# Patient Record
Sex: MALE | Race: WHITE | NOT HISPANIC OR LATINO | URBAN - METROPOLITAN AREA
[De-identification: names, ages, dates, MRNs, and addresses within clinical notes are randomized per-mention and may not be internally consistent; named-entity substitution may affect disease eponyms.]

---

## 2019-12-17 ENCOUNTER — OUTPATIENT (OUTPATIENT)
Dept: OUTPATIENT SERVICES | Facility: HOSPITAL | Age: 69
LOS: 1 days | Discharge: HOME | End: 2019-12-17
Payer: MEDICARE

## 2019-12-17 DIAGNOSIS — J18.9 PNEUMONIA, UNSPECIFIED ORGANISM: ICD-10-CM

## 2019-12-17 DIAGNOSIS — R05 COUGH: ICD-10-CM

## 2019-12-17 PROCEDURE — 71250 CT THORAX DX C-: CPT | Mod: 26

## 2020-02-18 ENCOUNTER — OUTPATIENT (OUTPATIENT)
Dept: OUTPATIENT SERVICES | Facility: HOSPITAL | Age: 70
LOS: 1 days | Discharge: HOME | End: 2020-02-18
Payer: MEDICARE

## 2020-02-18 DIAGNOSIS — R05 COUGH: ICD-10-CM

## 2020-02-18 PROCEDURE — 71250 CT THORAX DX C-: CPT | Mod: 26

## 2021-02-20 DIAGNOSIS — Z87.891 PERSONAL HISTORY OF NICOTINE DEPENDENCE: ICD-10-CM

## 2021-02-20 PROBLEM — Z00.00 ENCOUNTER FOR PREVENTIVE HEALTH EXAMINATION: Status: ACTIVE | Noted: 2021-02-20

## 2021-02-20 RX ORDER — DIAZEPAM 10 MG/1
10 TABLET ORAL
Refills: 0 | Status: ACTIVE | COMMUNITY

## 2021-02-20 RX ORDER — METFORMIN HYDROCHLORIDE 500 MG/1
500 TABLET, COATED ORAL
Refills: 0 | Status: ACTIVE | COMMUNITY

## 2021-02-20 RX ORDER — SIMVASTATIN 10 MG/1
10 TABLET, FILM COATED ORAL
Refills: 0 | Status: ACTIVE | COMMUNITY

## 2021-02-20 RX ORDER — ENALAPRIL MALEATE 20 MG/1
20 TABLET ORAL
Refills: 0 | Status: ACTIVE | COMMUNITY

## 2021-02-28 ENCOUNTER — LABORATORY RESULT (OUTPATIENT)
Age: 71
End: 2021-02-28

## 2021-02-28 ENCOUNTER — OUTPATIENT (OUTPATIENT)
Dept: OUTPATIENT SERVICES | Facility: HOSPITAL | Age: 71
LOS: 1 days | Discharge: HOME | End: 2021-02-28

## 2021-02-28 DIAGNOSIS — Z11.59 ENCOUNTER FOR SCREENING FOR OTHER VIRAL DISEASES: ICD-10-CM

## 2021-03-03 ENCOUNTER — APPOINTMENT (OUTPATIENT)
Dept: PULMONOLOGY | Facility: CLINIC | Age: 71
End: 2021-03-03
Payer: MEDICARE

## 2021-03-03 VITALS
OXYGEN SATURATION: 91 % | DIASTOLIC BLOOD PRESSURE: 88 MMHG | HEIGHT: 70 IN | BODY MASS INDEX: 26.2 KG/M2 | SYSTOLIC BLOOD PRESSURE: 144 MMHG | RESPIRATION RATE: 14 BRPM | WEIGHT: 183 LBS | TEMPERATURE: 97 F | HEART RATE: 96 BPM

## 2021-03-03 PROCEDURE — 99213 OFFICE O/P EST LOW 20 MIN: CPT | Mod: 25

## 2021-03-03 PROCEDURE — ZZZZZ: CPT

## 2021-03-03 PROCEDURE — 94010 BREATHING CAPACITY TEST: CPT

## 2021-03-03 PROCEDURE — 94727 GAS DIL/WSHOT DETER LNG VOL: CPT

## 2021-03-03 PROCEDURE — 94729 DIFFUSING CAPACITY: CPT

## 2021-03-03 NOTE — HISTORY OF PRESENT ILLNESS
[Dyspnea on Exertion] : no dyspnea on exertion [Dyspnea at Rest] : no dyspnea at rest [Dry Cough] : no dry cough [Productive Cough] : no productive cough [Wheezing] : no wheezing [Hemoptysis] : no hemoptysis [Follow-Up - Routine Clinic] : a routine clinic follow-up of [Excessive Daytime Sleepiness] : excessive daytime sleepiness [Snoring] : snoring [Sleepy When Sedentary] : sleepy when sedentary [Impaired Concentration] : impaired concentration [Currently Experiencing] : The patient is currently experiencing symptoms. [None] : No associated symptoms are reported [Good Sleep Hygiene] : good sleep hygiene Patient baseline mental status

## 2021-03-03 NOTE — PHYSICAL EXAM
[No Acute Distress] : no acute distress [Normal Oropharynx] : normal oropharynx [Normal Appearance] : normal appearance [No Neck Mass] : no neck mass [Normal Rate/Rhythm] : normal rate/rhythm [Normal S1, S2] : normal s1, s2 [No Murmurs] : no murmurs [No Resp Distress] : no resp distress [Clear to Auscultation Bilaterally] : clear to auscultation bilaterally [Rales] : rales [No Abnormalities] : no abnormalities [Benign] : benign [Normal Gait] : normal gait [No Clubbing] : no clubbing [No Cyanosis] : no cyanosis [No Edema] : no edema [FROM] : FROM [Normal Color/ Pigmentation] : normal color/ pigmentation [No Focal Deficits] : no focal deficits [Oriented x3] : oriented x3 [Normal Affect] : normal affect

## 2021-03-16 ENCOUNTER — RESULT REVIEW (OUTPATIENT)
Age: 71
End: 2021-03-16

## 2021-03-16 ENCOUNTER — OUTPATIENT (OUTPATIENT)
Dept: OUTPATIENT SERVICES | Facility: HOSPITAL | Age: 71
LOS: 1 days | Discharge: HOME | End: 2021-03-16
Payer: MEDICARE

## 2021-03-16 DIAGNOSIS — J84.112 IDIOPATHIC PULMONARY FIBROSIS: ICD-10-CM

## 2021-03-16 PROCEDURE — 71250 CT THORAX DX C-: CPT | Mod: 26

## 2021-09-15 ENCOUNTER — APPOINTMENT (OUTPATIENT)
Dept: PULMONOLOGY | Facility: CLINIC | Age: 71
End: 2021-09-15

## 2021-12-12 ENCOUNTER — LABORATORY RESULT (OUTPATIENT)
Age: 71
End: 2021-12-12

## 2021-12-15 ENCOUNTER — APPOINTMENT (OUTPATIENT)
Age: 71
End: 2021-12-15
Payer: MEDICARE

## 2021-12-15 VITALS
BODY MASS INDEX: 26.2 KG/M2 | HEIGHT: 70 IN | RESPIRATION RATE: 14 BRPM | SYSTOLIC BLOOD PRESSURE: 124 MMHG | WEIGHT: 183 LBS | OXYGEN SATURATION: 97 % | HEART RATE: 83 BPM | DIASTOLIC BLOOD PRESSURE: 80 MMHG

## 2021-12-15 PROCEDURE — 94010 BREATHING CAPACITY TEST: CPT

## 2021-12-15 PROCEDURE — 94727 GAS DIL/WSHOT DETER LNG VOL: CPT

## 2021-12-15 PROCEDURE — 99214 OFFICE O/P EST MOD 30 MIN: CPT | Mod: 25

## 2021-12-15 PROCEDURE — 94729 DIFFUSING CAPACITY: CPT

## 2021-12-15 NOTE — HISTORY OF PRESENT ILLNESS
[Dyspnea on Exertion] : no dyspnea on exertion [Dyspnea at Rest] : no dyspnea at rest [Dry Cough] : no dry cough [Productive Cough] : no productive cough [Wheezing] : no wheezing [Hemoptysis] : no hemoptysis [Follow-Up - Routine Clinic] : a routine clinic follow-up of [Excessive Daytime Sleepiness] : excessive daytime sleepiness [Snoring] : snoring [Sleepy When Sedentary] : sleepy when sedentary [Impaired Concentration] : impaired concentration [Currently Experiencing] : The patient is currently experiencing symptoms. [None] : No associated symptoms are reported [Good Sleep Hygiene] : good sleep hygiene

## 2022-01-05 ENCOUNTER — RESULT REVIEW (OUTPATIENT)
Age: 72
End: 2022-01-05

## 2022-01-05 ENCOUNTER — OUTPATIENT (OUTPATIENT)
Dept: OUTPATIENT SERVICES | Facility: HOSPITAL | Age: 72
LOS: 1 days | Discharge: HOME | End: 2022-01-05
Payer: MEDICARE

## 2022-01-05 DIAGNOSIS — R91.1 SOLITARY PULMONARY NODULE: ICD-10-CM

## 2022-01-05 PROCEDURE — 71250 CT THORAX DX C-: CPT | Mod: 26,MH

## 2022-03-16 ENCOUNTER — APPOINTMENT (OUTPATIENT)
Age: 72
End: 2022-03-16
Payer: MEDICARE

## 2022-03-16 VITALS
BODY MASS INDEX: 28.06 KG/M2 | HEIGHT: 70 IN | WEIGHT: 196 LBS | HEART RATE: 83 BPM | OXYGEN SATURATION: 96 % | SYSTOLIC BLOOD PRESSURE: 140 MMHG | RESPIRATION RATE: 12 BRPM | DIASTOLIC BLOOD PRESSURE: 80 MMHG

## 2022-03-16 PROCEDURE — 99214 OFFICE O/P EST MOD 30 MIN: CPT

## 2022-09-14 ENCOUNTER — APPOINTMENT (OUTPATIENT)
Age: 72
End: 2022-09-14

## 2022-09-14 VITALS
DIASTOLIC BLOOD PRESSURE: 70 MMHG | WEIGHT: 194 LBS | HEIGHT: 70 IN | HEART RATE: 88 BPM | SYSTOLIC BLOOD PRESSURE: 126 MMHG | BODY MASS INDEX: 27.77 KG/M2 | OXYGEN SATURATION: 96 % | RESPIRATION RATE: 14 BRPM

## 2022-09-14 PROCEDURE — 94727 GAS DIL/WSHOT DETER LNG VOL: CPT

## 2022-09-14 PROCEDURE — 99214 OFFICE O/P EST MOD 30 MIN: CPT | Mod: 25

## 2022-09-14 PROCEDURE — 94010 BREATHING CAPACITY TEST: CPT

## 2022-09-14 PROCEDURE — 94729 DIFFUSING CAPACITY: CPT

## 2022-09-14 RX ORDER — NINTEDANIB 150 MG/1
150 CAPSULE ORAL TWICE DAILY
Qty: 60 | Refills: 5 | Status: ACTIVE | COMMUNITY
Start: 2022-09-14 | End: 1900-01-01

## 2022-12-14 ENCOUNTER — APPOINTMENT (OUTPATIENT)
Age: 72
End: 2022-12-14

## 2022-12-14 VITALS
DIASTOLIC BLOOD PRESSURE: 70 MMHG | WEIGHT: 190 LBS | HEART RATE: 80 BPM | HEIGHT: 70 IN | OXYGEN SATURATION: 95 % | BODY MASS INDEX: 27.2 KG/M2 | SYSTOLIC BLOOD PRESSURE: 140 MMHG | RESPIRATION RATE: 12 BRPM

## 2022-12-14 PROCEDURE — 99214 OFFICE O/P EST MOD 30 MIN: CPT

## 2022-12-14 RX ORDER — PIRFENIDONE 267 MG/1
267 CAPSULE ORAL 3 TIMES DAILY
Qty: 270 | Refills: 3 | Status: ACTIVE | COMMUNITY
Start: 2022-12-14 | End: 1900-01-01

## 2022-12-14 NOTE — HISTORY OF PRESENT ILLNESS
[Follow-Up - Routine Clinic] : a routine clinic follow-up of [Excessive Daytime Sleepiness] : excessive daytime sleepiness [Snoring] : snoring [Sleepy When Sedentary] : sleepy when sedentary [Impaired Concentration] : impaired concentration [Currently Experiencing] : The patient is currently experiencing symptoms. [None] : No associated symptoms are reported [Good Sleep Hygiene] : good sleep hygiene [Dyspnea on Exertion] : no dyspnea on exertion [Dyspnea at Rest] : no dyspnea at rest [Dry Cough] : no dry cough [Productive Cough] : no productive cough [Wheezing] : no wheezing [Hemoptysis] : no hemoptysis

## 2022-12-21 ENCOUNTER — APPOINTMENT (OUTPATIENT)
Age: 72
End: 2022-12-21

## 2022-12-21 PROCEDURE — 94010 BREATHING CAPACITY TEST: CPT

## 2022-12-21 PROCEDURE — 94727 GAS DIL/WSHOT DETER LNG VOL: CPT

## 2022-12-21 PROCEDURE — 94729 DIFFUSING CAPACITY: CPT

## 2023-01-02 ENCOUNTER — OUTPATIENT (OUTPATIENT)
Dept: OUTPATIENT SERVICES | Facility: HOSPITAL | Age: 73
LOS: 1 days | Discharge: HOME | End: 2023-01-02
Payer: MEDICARE

## 2023-01-02 ENCOUNTER — RESULT REVIEW (OUTPATIENT)
Age: 73
End: 2023-01-02

## 2023-01-02 DIAGNOSIS — R91.1 SOLITARY PULMONARY NODULE: ICD-10-CM

## 2023-01-02 PROCEDURE — 71250 CT THORAX DX C-: CPT | Mod: 26,MH

## 2023-04-17 ENCOUNTER — APPOINTMENT (OUTPATIENT)
Dept: PULMONOLOGY | Facility: CLINIC | Age: 73
End: 2023-04-17
Payer: MEDICARE

## 2023-04-17 VITALS
BODY MASS INDEX: 27.2 KG/M2 | HEART RATE: 73 BPM | SYSTOLIC BLOOD PRESSURE: 120 MMHG | RESPIRATION RATE: 14 BRPM | HEIGHT: 70 IN | DIASTOLIC BLOOD PRESSURE: 80 MMHG | OXYGEN SATURATION: 96 % | WEIGHT: 190 LBS

## 2023-04-17 PROCEDURE — 99214 OFFICE O/P EST MOD 30 MIN: CPT | Mod: 25

## 2023-10-23 ENCOUNTER — APPOINTMENT (OUTPATIENT)
Dept: PULMONOLOGY | Facility: CLINIC | Age: 73
End: 2023-10-23
Payer: MEDICARE

## 2023-10-23 VITALS
SYSTOLIC BLOOD PRESSURE: 136 MMHG | RESPIRATION RATE: 14 BRPM | BODY MASS INDEX: 27.2 KG/M2 | HEIGHT: 70 IN | HEART RATE: 70 BPM | WEIGHT: 190 LBS | OXYGEN SATURATION: 96 % | DIASTOLIC BLOOD PRESSURE: 86 MMHG

## 2023-10-23 PROCEDURE — 94729 DIFFUSING CAPACITY: CPT

## 2023-10-23 PROCEDURE — 94010 BREATHING CAPACITY TEST: CPT

## 2023-10-23 PROCEDURE — 99213 OFFICE O/P EST LOW 20 MIN: CPT | Mod: 25

## 2024-01-08 ENCOUNTER — OUTPATIENT (OUTPATIENT)
Dept: OUTPATIENT SERVICES | Facility: HOSPITAL | Age: 74
LOS: 1 days | End: 2024-01-08
Payer: MEDICARE

## 2024-01-08 ENCOUNTER — RESULT REVIEW (OUTPATIENT)
Age: 74
End: 2024-01-08

## 2024-01-08 DIAGNOSIS — J84.9 INTERSTITIAL PULMONARY DISEASE, UNSPECIFIED: ICD-10-CM

## 2024-01-08 PROCEDURE — 71250 CT THORAX DX C-: CPT

## 2024-01-08 PROCEDURE — 71250 CT THORAX DX C-: CPT | Mod: 26,MH

## 2024-01-09 DIAGNOSIS — J84.9 INTERSTITIAL PULMONARY DISEASE, UNSPECIFIED: ICD-10-CM

## 2024-02-21 ENCOUNTER — APPOINTMENT (OUTPATIENT)
Dept: PULMONOLOGY | Facility: CLINIC | Age: 74
End: 2024-02-21
Payer: MEDICARE

## 2024-02-21 VITALS
HEIGHT: 70 IN | SYSTOLIC BLOOD PRESSURE: 170 MMHG | WEIGHT: 173 LBS | DIASTOLIC BLOOD PRESSURE: 90 MMHG | OXYGEN SATURATION: 95 % | RESPIRATION RATE: 14 BRPM | HEART RATE: 85 BPM | BODY MASS INDEX: 24.77 KG/M2

## 2024-02-21 PROCEDURE — 94727 GAS DIL/WSHOT DETER LNG VOL: CPT

## 2024-02-21 PROCEDURE — 99214 OFFICE O/P EST MOD 30 MIN: CPT | Mod: 25

## 2024-02-21 PROCEDURE — 94729 DIFFUSING CAPACITY: CPT

## 2024-02-21 PROCEDURE — 94010 BREATHING CAPACITY TEST: CPT

## 2024-02-21 NOTE — ASSESSMENT
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  PFT shows some declining function Was unable to get Ofev or Pirfenidone.   Possible OPAL not interested in testing at this point

## 2024-02-27 ENCOUNTER — APPOINTMENT (OUTPATIENT)
Dept: HEMATOLOGY ONCOLOGY | Facility: CLINIC | Age: 74
End: 2024-02-27
Payer: MEDICARE

## 2024-02-27 ENCOUNTER — OUTPATIENT (OUTPATIENT)
Dept: OUTPATIENT SERVICES | Facility: HOSPITAL | Age: 74
LOS: 1 days | End: 2024-02-27
Payer: MEDICARE

## 2024-02-27 VITALS
WEIGHT: 173 LBS | HEART RATE: 94 BPM | BODY MASS INDEX: 24.77 KG/M2 | HEIGHT: 70 IN | TEMPERATURE: 98.2 F | RESPIRATION RATE: 16 BRPM

## 2024-02-27 VITALS — SYSTOLIC BLOOD PRESSURE: 160 MMHG | DIASTOLIC BLOOD PRESSURE: 80 MMHG

## 2024-02-27 DIAGNOSIS — D68.2 HEREDITARY DEFICIENCY OF OTHER CLOTTING FACTORS: ICD-10-CM

## 2024-02-27 PROCEDURE — 81270 JAK2 GENE: CPT

## 2024-02-27 PROCEDURE — 99205 OFFICE O/P NEW HI 60 MIN: CPT

## 2024-02-27 PROCEDURE — 83550 IRON BINDING TEST: CPT

## 2024-02-27 PROCEDURE — G0452: CPT | Mod: 26

## 2024-02-27 PROCEDURE — 85027 COMPLETE CBC AUTOMATED: CPT

## 2024-02-27 PROCEDURE — 81206 BCR/ABL1 GENE MAJOR BP: CPT

## 2024-02-27 PROCEDURE — 81207 BCR/ABL1 GENE MINOR BP: CPT

## 2024-02-27 PROCEDURE — 80076 HEPATIC FUNCTION PANEL: CPT

## 2024-02-27 PROCEDURE — 80048 BASIC METABOLIC PNL TOTAL CA: CPT

## 2024-02-27 PROCEDURE — 83540 ASSAY OF IRON: CPT

## 2024-02-27 PROCEDURE — 82728 ASSAY OF FERRITIN: CPT

## 2024-02-27 NOTE — HISTORY OF PRESENT ILLNESS
[de-identified] : Mr. GREGOR BRAVO is a 73 year old male here today for evaluation and management of abnormal labwork (leukocytosis, thrombocytosis)    GREGOR is a 73 year old M with PMHx including ILD, IPF, OPAL, DM, CAD s/p stent placement in 2007, who presents to clinic to establish care.  Patient states that he had labwork performed with PCP in 11/2023 which showed elevated WBC and PLTs.  Patient was prescribed abx at the time since he was not feeling well at the time and was noted to have leukocytosis.  He still felt sick and was prescribed steroids in 01/2024.  He is presently feeling well with no new complaints.  Patient denies fevers, chills, recurrent infections, drenching night sweats, chest pain, dyspnea, unintentional weight loss or bleeding.  He notes that he purposefully lost weight over the last few months via diet modification.  Patient reports family history of malignancy or blood/clotting disorder.     RADIOLOGIC WORKUP CT Chest (1.8.2024) IMPRESSION:1. New patchy/nodular groundglass opacities, mucoid impaction, moderate air trapping, and likely reactive bihilar and mediastinal lymphadenopathy. Consider posttreatment 3 month follow-up CT chest.2. Mildly progressed UIP pattern ILD.     LAB WORKUP (2.23.2024) WBC 12.4, Hgb 14.8, , ANC 8804, ALC 2331, [place] 930, Eos 211, Baso 124 (12.29.2023 - [facility]) WBC 16.7, Hgb 13.7, Hct 40.5, , ANC 88781, ALC 2488, [place] 1188, Eos 284, Baso 134, Cr 1, eGFR 76, normal LFTs (11.23.2023 - [facility]) WBC 15, Hgb 14, Hct 41.1, , ANC 11.1, ALC 2.6, Mono 0.9, Eos 0.2, Baso 0.1 (6.28.2023) WBC 8.25  (6.6.2022) WBC 8.33, Hgb 14.7,   (1.31.2022) WBC 9.12, Hgb 14.3,    HCM  Colonoscopy overdue  Upper Endoscopy never done

## 2024-02-27 NOTE — REVIEW OF SYSTEMS
[Recent Change In Weight] : ~T recent weight change [Diarrhea: Grade 0] : Diarrhea: Grade 0 [Negative] : Heme/Lymph [FreeTextEntry2] : lost weight over last 4 months via diet modification

## 2024-02-27 NOTE — ASSESSMENT
[FreeTextEntry1] : # Leukocytosis, neutrophil-predominant, noted since 2023 - likely due to acute exacerbation of underlying pulmonary disease  - gradually improving as evidenced on subsequent CBCs - Labwork today: CBC, BMP, LFTs, iron studies, ferritin, JAK2 testing    # Thrombocytosis, intermittent since 2023 - resolved, possibly acute phase reactant   Encouraged to keep up to date with age appropriate screenings including but not limited to colonoscopy and upper endoscopy if needed.    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

## 2024-02-28 DIAGNOSIS — D68.2 HEREDITARY DEFICIENCY OF OTHER CLOTTING FACTORS: ICD-10-CM

## 2024-02-28 LAB
ALBUMIN SERPL ELPH-MCNC: 4.2 G/DL
ALP BLD-CCNC: 91 U/L
ALT SERPL-CCNC: 13 U/L
ANION GAP SERPL CALC-SCNC: 16 MMOL/L
AST SERPL-CCNC: 18 U/L
BASOPHILS # BLD AUTO: 0.13 K/UL
BASOPHILS NFR BLD AUTO: 0.9 %
BILIRUB DIRECT SERPL-MCNC: <0.2 MG/DL
BILIRUB INDIRECT SERPL-MCNC: >0.1 MG/DL
BILIRUB SERPL-MCNC: 0.3 MG/DL
BUN SERPL-MCNC: 19 MG/DL
CALCIUM SERPL-MCNC: 10 MG/DL
CHLORIDE SERPL-SCNC: 102 MMOL/L
CO2 SERPL-SCNC: 21 MMOL/L
CREAT SERPL-MCNC: 0.9 MG/DL
EGFR: 89 ML/MIN/1.73M2
EOSINOPHIL # BLD AUTO: 0.22 K/UL
EOSINOPHIL NFR BLD AUTO: 1.4 %
FERRITIN SERPL-MCNC: 209 NG/ML
GLUCOSE SERPL-MCNC: 128 MG/DL
HCT VFR BLD CALC: 44.4 %
HGB BLD-MCNC: 15 G/DL
IMM GRANULOCYTES NFR BLD AUTO: 0.3 %
IRON SATN MFR SERPL: 12 %
IRON SERPL-MCNC: 31 UG/DL
LYMPHOCYTES # BLD AUTO: 2.44 K/UL
LYMPHOCYTES NFR BLD AUTO: 16.1 %
MAN DIFF?: NORMAL
MCHC RBC-ENTMCNC: 29.9 PG
MCHC RBC-ENTMCNC: 33.8 G/DL
MCV RBC AUTO: 88.4 FL
MONOCYTES # BLD AUTO: 1.12 K/UL
MONOCYTES NFR BLD AUTO: 7.4 %
NEUTROPHILS # BLD AUTO: 11.25 K/UL
NEUTROPHILS NFR BLD AUTO: 73.9 %
PLATELET # BLD AUTO: 352 K/UL
PMV BLD AUTO: 0 /100 WBCS
POTASSIUM SERPL-SCNC: 4.5 MMOL/L
PROT SERPL-MCNC: 8.7 G/DL
RBC # BLD: 5.02 M/UL
RBC # FLD: 14.3 %
SODIUM SERPL-SCNC: 139 MMOL/L
TIBC SERPL-MCNC: 254 UG/DL
UIBC SERPL-MCNC: 223 UG/DL
WBC # FLD AUTO: 15.2 K/UL

## 2024-03-04 LAB — T(9;22)(ABL1,BCR)/CONTROL BLD/T: NORMAL

## 2024-03-05 LAB
JAK2 P.V617F BLD/T QL: NORMAL
REFLEX:: NORMAL

## 2024-03-18 ENCOUNTER — APPOINTMENT (OUTPATIENT)
Dept: HEMATOLOGY ONCOLOGY | Facility: CLINIC | Age: 74
End: 2024-03-18
Payer: MEDICARE

## 2024-03-18 ENCOUNTER — OUTPATIENT (OUTPATIENT)
Dept: OUTPATIENT SERVICES | Facility: HOSPITAL | Age: 74
LOS: 1 days | End: 2024-03-18
Payer: MEDICARE

## 2024-03-18 DIAGNOSIS — D75.839 THROMBOCYTOSIS, UNSPECIFIED: ICD-10-CM

## 2024-03-18 PROCEDURE — 99214 OFFICE O/P EST MOD 30 MIN: CPT

## 2024-03-18 NOTE — REVIEW OF SYSTEMS
[Recent Change In Weight] : ~T recent weight change [Diarrhea: Grade 0] : Diarrhea: Grade 0 [Negative] : Allergic/Immunologic [FreeTextEntry2] : lost weight over last 4 months via diet modification

## 2024-03-18 NOTE — HISTORY OF PRESENT ILLNESS
[de-identified] : Mr. GREGOR BRAVO is a 73 year old male here today for evaluation and management of abnormal labwork (leukocytosis, thrombocytosis)    GREGOR is a 73 year old M with PMHx including ILD, IPF, OPAL, DM, CAD s/p stent placement in 2007, who presents to clinic to establish care.  Patient states that he had labwork performed with PCP in 11/2023 which showed elevated WBC and PLTs.  Patient was prescribed abx at the time since he was not feeling well at the time and was noted to have leukocytosis.  He still felt sick and was prescribed steroids in 01/2024.  He is presently feeling well with no new complaints.  Patient denies fevers, chills, recurrent infections, drenching night sweats, chest pain, dyspnea, unintentional weight loss or bleeding.  He notes that he purposefully lost weight over the last few months via diet modification.  Patient reports family history of malignancy or blood/clotting disorder.     RADIOLOGIC WORKUP CT Chest (1.8.2024) IMPRESSION:1. New patchy/nodular groundglass opacities, mucoid impaction, moderate air trapping, and likely reactive bihilar and mediastinal lymphadenopathy. Consider posttreatment 3 month follow-up CT chest.2. Mildly progressed UIP pattern ILD.     LAB WORKUP (2.23.2024) WBC 12.4, Hgb 14.8, , ANC 8804, ALC 2331, [place] 930, Eos 211, Baso 124 (12.29.2023 - [facility]) WBC 16.7, Hgb 13.7, Hct 40.5, , ANC 24651, ALC 2488, Mono 1188, Eos 284, Baso 134, Cr 1, eGFR 76, normal LFTs (11.23.2023 - [facility]) WBC 15, Hgb 14, Hct 41.1, , ANC 11.1, ALC 2.6, Mono 0.9, Eos 0.2, Baso 0.1 (6.28.2023) WBC 8.25  (6.6.2022) WBC 8.33, Hgb 14.7,   (1.31.2022) WBC 9.12, Hgb 14.3,    HCM  Colonoscopy overdue  Upper Endoscopy never done  [de-identified] : 3/18/24 Patient is here for a follow-up visit for abnormal labwork (leukocytosis, thrombocytosis).  He is feeling well with no new complaints.  Reviewed most recent CBC, which is stable.  Patient denies fever, chills, nausea, vomiting, dyspnea or bleeding.  Reviewed initial workup which was essentially unrevealing: BCR-ABL not detected and Negative for JAK2 V617F mutation.

## 2024-03-18 NOTE — ASSESSMENT
[FreeTextEntry1] : # Leukocytosis, neutrophil-predominant, noted since 2023 - likely due to acute exacerbation of underlying pulmonary disease  - gradually improving as evidenced on subsequent CBCs - Labwork today: CBC, BMP, LFTs, iron studies, ferritin, JAK2 testing    # Thrombocytosis, intermittent since 2023 - resolved, possibly acute phase reactant   Encouraged to keep up to date with age appropriate screenings including but not limited to colonoscopy and upper endoscopy if needed.    RTC in 3 weeks to discuss findings   74 yo man with pulmonary fibroisis is evalauted for mild leukocytosis, noted in 11-12/2023 at the time of exacerbation; has been treated by pulmonary team and PMD; over the past month, his symptoms have improved; i suspect that mild leukocytosis is due to acute exacerbation of pulmonary fibroiss/ inflammation; doubt this is related due to chronic or acute bone marrow disorder; will repeat labs including BCRABL is not detected, which makes leukocytosis is more reactive than due to bone marrow dx;  iron deficiency; repeat labs in 2=3 mos

## 2024-03-19 DIAGNOSIS — D75.839 THROMBOCYTOSIS, UNSPECIFIED: ICD-10-CM

## 2024-05-08 ENCOUNTER — APPOINTMENT (OUTPATIENT)
Dept: PULMONOLOGY | Facility: CLINIC | Age: 74
End: 2024-05-08
Payer: MEDICARE

## 2024-05-08 VITALS
SYSTOLIC BLOOD PRESSURE: 112 MMHG | HEIGHT: 70 IN | RESPIRATION RATE: 14 BRPM | HEART RATE: 92 BPM | DIASTOLIC BLOOD PRESSURE: 80 MMHG | OXYGEN SATURATION: 95 % | WEIGHT: 174 LBS | BODY MASS INDEX: 24.91 KG/M2

## 2024-05-08 DIAGNOSIS — J84.9 INTERSTITIAL PULMONARY DISEASE, UNSPECIFIED: ICD-10-CM

## 2024-05-08 DIAGNOSIS — J84.112 IDIOPATHIC PULMONARY FIBROSIS: ICD-10-CM

## 2024-05-08 DIAGNOSIS — R91.8 OTHER NONSPECIFIC ABNORMAL FINDING OF LUNG FIELD: ICD-10-CM

## 2024-05-08 DIAGNOSIS — G47.33 OBSTRUCTIVE SLEEP APNEA (ADULT) (PEDIATRIC): ICD-10-CM

## 2024-05-08 PROCEDURE — 99214 OFFICE O/P EST MOD 30 MIN: CPT

## 2024-05-08 NOTE — ASSESSMENT
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  PFT shows some declining function Was unable to get Ofev or Pirfenidone.   Now on Ofev  Possible OPAL not interested in testing at this point

## 2024-06-03 RX ORDER — NINTEDANIB 150 MG/1
150 CAPSULE ORAL
Qty: 180 | Refills: 3 | Status: ACTIVE | COMMUNITY
Start: 2024-02-21 | End: 1900-01-01

## 2024-06-19 ENCOUNTER — LABORATORY RESULT (OUTPATIENT)
Age: 74
End: 2024-06-19

## 2024-06-19 ENCOUNTER — OUTPATIENT (OUTPATIENT)
Dept: OUTPATIENT SERVICES | Facility: HOSPITAL | Age: 74
LOS: 1 days | End: 2024-06-19
Payer: MEDICARE

## 2024-06-19 ENCOUNTER — APPOINTMENT (OUTPATIENT)
Age: 74
End: 2024-06-19
Payer: MEDICARE

## 2024-06-19 VITALS
DIASTOLIC BLOOD PRESSURE: 86 MMHG | BODY MASS INDEX: 26 KG/M2 | TEMPERATURE: 97.7 F | WEIGHT: 175.56 LBS | HEIGHT: 69 IN | SYSTOLIC BLOOD PRESSURE: 178 MMHG | HEART RATE: 78 BPM | OXYGEN SATURATION: 96 %

## 2024-06-19 DIAGNOSIS — D72.829 ELEVATED WHITE BLOOD CELL COUNT, UNSPECIFIED: ICD-10-CM

## 2024-06-19 DIAGNOSIS — D75.839 THROMBOCYTOSIS, UNSPECIFIED: ICD-10-CM

## 2024-06-19 LAB
ALBUMIN SERPL ELPH-MCNC: 4.6 G/DL
ALP BLD-CCNC: 64 U/L
ALT SERPL-CCNC: 15 U/L
ANION GAP SERPL CALC-SCNC: 12 MMOL/L
AST SERPL-CCNC: 19 U/L
BILIRUB DIRECT SERPL-MCNC: <0.2 MG/DL
BILIRUB INDIRECT SERPL-MCNC: >0.2 MG/DL
BILIRUB SERPL-MCNC: 0.4 MG/DL
BUN SERPL-MCNC: 21 MG/DL
CALCIUM SERPL-MCNC: 9.9 MG/DL
CHLORIDE SERPL-SCNC: 103 MMOL/L
CO2 SERPL-SCNC: 26 MMOL/L
CREAT SERPL-MCNC: 0.9 MG/DL
EGFR: 89 ML/MIN/1.73M2
GLUCOSE SERPL-MCNC: 102 MG/DL
HCT VFR BLD CALC: 43.5 %
HGB BLD-MCNC: 14.6 G/DL
IRON SATN MFR SERPL: 24 %
IRON SERPL-MCNC: 65 UG/DL
MCHC RBC-ENTMCNC: 30.5 PG
MCHC RBC-ENTMCNC: 33.6 G/DL
MCV RBC AUTO: 91 FL
PLATELET # BLD AUTO: 233 K/UL
PMV BLD: 10.8 FL
POTASSIUM SERPL-SCNC: 4.6 MMOL/L
PROT SERPL-MCNC: 7.1 G/DL
RBC # BLD: 4.78 M/UL
RBC # FLD: 15.5 %
SODIUM SERPL-SCNC: 141 MMOL/L
TIBC SERPL-MCNC: 266 UG/DL
UIBC SERPL-MCNC: 201 UG/DL
WBC # FLD AUTO: 10.18 K/UL

## 2024-06-19 PROCEDURE — 80076 HEPATIC FUNCTION PANEL: CPT

## 2024-06-19 PROCEDURE — 83540 ASSAY OF IRON: CPT

## 2024-06-19 PROCEDURE — 87340 HEPATITIS B SURFACE AG IA: CPT

## 2024-06-19 PROCEDURE — 80048 BASIC METABOLIC PNL TOTAL CA: CPT

## 2024-06-19 PROCEDURE — 82728 ASSAY OF FERRITIN: CPT

## 2024-06-19 PROCEDURE — 99214 OFFICE O/P EST MOD 30 MIN: CPT

## 2024-06-19 PROCEDURE — 85027 COMPLETE CBC AUTOMATED: CPT

## 2024-06-19 PROCEDURE — 83550 IRON BINDING TEST: CPT

## 2024-06-19 PROCEDURE — 83036 HEMOGLOBIN GLYCOSYLATED A1C: CPT

## 2024-06-19 RX ORDER — METFORMIN HYDROCHLORIDE 500 MG/1
500 TABLET, COATED ORAL
Refills: 0 | Status: ACTIVE | COMMUNITY

## 2024-06-19 RX ORDER — EMPAGLIFLOZIN 25 MG/1
25 TABLET, FILM COATED ORAL
Refills: 0 | Status: ACTIVE | COMMUNITY

## 2024-06-19 RX ORDER — VALSARTAN 320 MG/1
320 TABLET, COATED ORAL
Refills: 0 | Status: ACTIVE | COMMUNITY

## 2024-06-19 RX ORDER — AMLODIPINE BESYLATE 5 MG/1
5 TABLET ORAL
Refills: 0 | Status: ACTIVE | COMMUNITY

## 2024-06-19 NOTE — HISTORY OF PRESENT ILLNESS
[de-identified] : Mr. GREGOR BRAVO is a 73 year old male here today for evaluation and management of abnormal labwork (leukocytosis, thrombocytosis)    GREGOR is a 73 year old M with PMHx including ILD, IPF, OPAL, DM, CAD s/p stent placement in 2007, who presents to clinic to establish care.  Patient states that he had labwork performed with PCP in 11/2023 which showed elevated WBC and PLTs.  Patient was prescribed abx at the time since he was not feeling well at the time and was noted to have leukocytosis.  He still felt sick and was prescribed steroids in 01/2024.  He is presently feeling well with no new complaints.  Patient denies fevers, chills, recurrent infections, drenching night sweats, chest pain, dyspnea, unintentional weight loss or bleeding.  He notes that he purposefully lost weight over the last few months via diet modification.  Patient reports family history of malignancy or blood/clotting disorder.     RADIOLOGIC WORKUP CT Chest (1.8.2024) IMPRESSION:1. New patchy/nodular groundglass opacities, mucoid impaction, moderate air trapping, and likely reactive bihilar and mediastinal lymphadenopathy. Consider posttreatment 3 month follow-up CT chest.2. Mildly progressed UIP pattern ILD.     LAB WORKUP (2.23.2024) WBC 12.4, Hgb 14.8, , ANC 8804, ALC 2331, [place] 930, Eos 211, Baso 124 (12.29.2023 - [facility]) WBC 16.7, Hgb 13.7, Hct 40.5, , ANC 78411, ALC 2488, Mono 1188, Eos 284, Baso 134, Cr 1, eGFR 76, normal LFTs (11.23.2023 - [facility]) WBC 15, Hgb 14, Hct 41.1, , ANC 11.1, ALC 2.6, Mono 0.9, Eos 0.2, Baso 0.1 (6.28.2023) WBC 8.25  (6.6.2022) WBC 8.33, Hgb 14.7,   (1.31.2022) WBC 9.12, Hgb 14.3,    HCM  Colonoscopy overdue  Upper Endoscopy never done  [de-identified] : 3/18/24 Patient is here for a follow-up visit for abnormal labwork (leukocytosis, thrombocytosis).  He is feeling well with no new complaints.  Reviewed most recent CBC, which is stable.  Patient denies fever, chills, nausea, vomiting, dyspnea or bleeding.  Reviewed initial workup which was essentially unrevealing: BCR-ABL not detected and Negative for JAK2 V617F mutation.    6/19/24 Patient is here for a follow-up visit for abnormal labwork (leukocytosis, thrombocytosis).  He is feeling well with no new complaints.  Reviewed most recent CBC, which is normal.  Patient denies fever, chills, nausea, vomiting, dyspnea or bleeding.  He had lapse in Ofev treatment from [facility] due to recent loss of his spouse and insurance troubles.

## 2024-06-19 NOTE — ASSESSMENT
[FreeTextEntry1] : # Leukocytosis, neutrophil-predominant, noted since 2023 - likely due to acute exacerbation of underlying pulmonary disease  - gradually improving as evidenced on subsequent CBCs - JAK2 (-) , BCRABL normal from 02/2024  - resolved   # Thrombocytosis, intermittent since 2023 - resolved, possibly acute phase reactant   Encouraged to keep up to date with age appropriate screenings including but not limited to colonoscopy and upper endoscopy if needed.  Advised to discuss with PCP regarding more strict control of BP.    Explained that he can likely follow with PCP for close monitoring, as long as they are comfortable, and return to clinic as needed for any hematologic/oncologic concern.  Otherwise, may return in 6 months with CBC.     72 yo man with pulmonary fibroisis is evalauted for mild leukocytosis, noted in 11-12/2023 at the time of exacerbation; has been treated by pulmonary team and PMD; over the past month, his symptoms have improved; i suspect that mild leukocytosis is due to acute exacerbation of pulmonary fibroiss/ inflammation; doubt this is related due to chronic or acute bone marrow disorder; will repeat labs including BCRABL is not detected, which makes leukocytosis is more reactive than due to bone marrow dx;  iron deficiency; repeat labs in 2=3 mos seen/examined w/ NP Yvette, note reviewed, case discussed; agree w/ plan

## 2024-06-20 DIAGNOSIS — D75.839 THROMBOCYTOSIS, UNSPECIFIED: ICD-10-CM

## 2024-06-20 LAB — FERRITIN SERPL-MCNC: 113 NG/ML

## 2024-07-16 ENCOUNTER — APPOINTMENT (OUTPATIENT)
Dept: PULMONOLOGY | Facility: HOSPITAL | Age: 74
End: 2024-07-16

## 2024-07-16 ENCOUNTER — OUTPATIENT (OUTPATIENT)
Dept: OUTPATIENT SERVICES | Facility: HOSPITAL | Age: 74
LOS: 1 days | End: 2024-07-16

## 2024-07-16 DIAGNOSIS — R06.02 SHORTNESS OF BREATH: ICD-10-CM

## 2024-07-16 PROCEDURE — 94664 DEMO&/EVAL PT USE INHALER: CPT

## 2024-07-16 PROCEDURE — 94729 DIFFUSING CAPACITY: CPT

## 2024-07-16 PROCEDURE — 94060 EVALUATION OF WHEEZING: CPT | Mod: 26

## 2024-07-16 PROCEDURE — 94070 EVALUATION OF WHEEZING: CPT

## 2024-07-16 PROCEDURE — 94729 DIFFUSING CAPACITY: CPT | Mod: 26

## 2024-07-16 PROCEDURE — 94727 GAS DIL/WSHOT DETER LNG VOL: CPT | Mod: 26

## 2024-07-16 PROCEDURE — 94727 GAS DIL/WSHOT DETER LNG VOL: CPT

## 2024-07-17 DIAGNOSIS — R06.02 SHORTNESS OF BREATH: ICD-10-CM

## 2024-09-09 ENCOUNTER — APPOINTMENT (OUTPATIENT)
Dept: PULMONOLOGY | Facility: CLINIC | Age: 74
End: 2024-09-09
Payer: MEDICARE

## 2024-09-09 DIAGNOSIS — J84.112 IDIOPATHIC PULMONARY FIBROSIS: ICD-10-CM

## 2024-09-09 DIAGNOSIS — R91.8 OTHER NONSPECIFIC ABNORMAL FINDING OF LUNG FIELD: ICD-10-CM

## 2024-09-09 DIAGNOSIS — G47.33 OBSTRUCTIVE SLEEP APNEA (ADULT) (PEDIATRIC): ICD-10-CM

## 2024-09-09 DIAGNOSIS — J84.9 INTERSTITIAL PULMONARY DISEASE, UNSPECIFIED: ICD-10-CM

## 2024-09-09 PROCEDURE — 99214 OFFICE O/P EST MOD 30 MIN: CPT

## 2024-09-09 PROCEDURE — G2211 COMPLEX E/M VISIT ADD ON: CPT

## 2024-09-09 NOTE — ASSESSMENT
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  PFT Stable to improving  Was unable to get Ofev or Pirfenidone.   Now on Ofev 150 BID  Possible OPAL not interested in testing at this point

## 2024-09-16 ENCOUNTER — APPOINTMENT (OUTPATIENT)
Dept: PULMONOLOGY | Facility: HOSPITAL | Age: 74
End: 2024-09-16

## 2024-09-24 ENCOUNTER — APPOINTMENT (OUTPATIENT)
Dept: ORTHOPEDIC SURGERY | Facility: CLINIC | Age: 74
End: 2024-09-24

## 2024-09-24 DIAGNOSIS — S52.124A NONDISPLACED FRACTURE OF HEAD OF RIGHT RADIUS, INITIAL ENCOUNTER FOR CLOSED FRACTURE: ICD-10-CM

## 2024-09-24 PROCEDURE — 99203 OFFICE O/P NEW LOW 30 MIN: CPT

## 2024-09-24 PROCEDURE — 24650 CLTX RDL HEAD/NCK FX WO MNPJ: CPT | Mod: RT

## 2024-09-24 PROCEDURE — 73080 X-RAY EXAM OF ELBOW: CPT | Mod: RT

## 2024-09-24 NOTE — HISTORY OF PRESENT ILLNESS
[de-identified] : 74-year-old gentleman presents to my office on referral for management of right hand dominant radial head fracture sustained as result of a ground-level fall on August 9, 2024.  Initially attributed his pain to a contusion or sprain he was seen by his primary medical doctor.  Because of persistent pain radiographs obtained which demonstrated above-noted fracture.  Patient referred to my office for management.  The patient notes that this pain has been steadily improving.  His function has been improving.  He has avoided any lifting with his arm which exacerbates the pain.  Certain rotational motions also elicit discomfort.  He has no sensory complaints.  He is right-hand dominant.  No history of prior elbow problems or problems with his right upper extremity in general.  Past medical history: Non-insulin-dependent diabetes Hypertension Hyperlipidemia Interstitial lung disease  Medications: Valsartan Amlodipine Metformin Jardiance Stan? Simvastatin Valium as needed  Social: Non-smoker, noted drinking, recent , retired

## 2024-09-24 NOTE — ASSESSMENT
[FreeTextEntry1] : 74-year-old gentleman nondisplaced right radial head fracture amenable to continue nonoperative management.  He is a 5 pound lifting restriction at this point time which can be advanced to 10 pounds in 3 weeks.  He can utilize his brace as needed.  Will have him follow-up in 3 months for interval check.  At 12 weeks from injury can eliminate his restrictions.  If he is doing particular well he can cancel follow-up visit.  If he wants we can obtain repeat radiographs on follow-up.  All questions were answered to his satisfaction.  Copy note should be CCed to his primary medical doctor.  Patient agrees with the plan.

## 2024-09-24 NOTE — IMAGING
[de-identified] : Pleasant older gentleman sits comfortably my office no distress.  Physical examination: Left elbow: Some tenderness palpation over the lateral aspects of his elbow over the radiocapitellar joint.  Mild crepitus.  This tenderness is minimal.  Range of motion of his elbow is full 0-145 degrees.  He has essentially full supination pronation arc.  No epitrochlear lymph nodes.  No undue swelling.  Radiographs: Right elbow (AP, internal rotation AP, lateral): Nondisplaced right radial head fracture.

## 2024-10-28 ENCOUNTER — OUTPATIENT (OUTPATIENT)
Dept: OUTPATIENT SERVICES | Facility: HOSPITAL | Age: 74
LOS: 1 days | End: 2024-10-28
Payer: MEDICARE

## 2024-10-28 ENCOUNTER — APPOINTMENT (OUTPATIENT)
Dept: PULMONOLOGY | Facility: HOSPITAL | Age: 74
End: 2024-10-28

## 2024-10-28 DIAGNOSIS — R06.02 SHORTNESS OF BREATH: ICD-10-CM

## 2024-10-28 PROCEDURE — 94070 EVALUATION OF WHEEZING: CPT

## 2024-10-28 PROCEDURE — 94727 GAS DIL/WSHOT DETER LNG VOL: CPT

## 2024-10-28 PROCEDURE — 94729 DIFFUSING CAPACITY: CPT

## 2024-10-28 PROCEDURE — 94729 DIFFUSING CAPACITY: CPT | Mod: 26

## 2024-10-28 PROCEDURE — 94060 EVALUATION OF WHEEZING: CPT | Mod: 26

## 2024-10-28 PROCEDURE — 94727 GAS DIL/WSHOT DETER LNG VOL: CPT | Mod: 26

## 2024-10-28 PROCEDURE — 94664 DEMO&/EVAL PT USE INHALER: CPT

## 2024-10-29 DIAGNOSIS — R06.02 SHORTNESS OF BREATH: ICD-10-CM

## 2024-11-04 ENCOUNTER — APPOINTMENT (OUTPATIENT)
Dept: PULMONOLOGY | Facility: CLINIC | Age: 74
End: 2024-11-04
Payer: MEDICARE

## 2024-11-04 VITALS
HEIGHT: 69 IN | DIASTOLIC BLOOD PRESSURE: 96 MMHG | HEART RATE: 85 BPM | SYSTOLIC BLOOD PRESSURE: 156 MMHG | WEIGHT: 185 LBS | BODY MASS INDEX: 27.4 KG/M2 | RESPIRATION RATE: 14 BRPM | OXYGEN SATURATION: 95 %

## 2024-11-04 DIAGNOSIS — G47.33 OBSTRUCTIVE SLEEP APNEA (ADULT) (PEDIATRIC): ICD-10-CM

## 2024-11-04 DIAGNOSIS — J84.112 IDIOPATHIC PULMONARY FIBROSIS: ICD-10-CM

## 2024-11-04 DIAGNOSIS — J84.9 INTERSTITIAL PULMONARY DISEASE, UNSPECIFIED: ICD-10-CM

## 2024-11-04 PROCEDURE — G2211 COMPLEX E/M VISIT ADD ON: CPT

## 2024-11-04 PROCEDURE — 99214 OFFICE O/P EST MOD 30 MIN: CPT

## 2024-12-16 ENCOUNTER — OUTPATIENT (OUTPATIENT)
Dept: OUTPATIENT SERVICES | Facility: HOSPITAL | Age: 74
LOS: 1 days | End: 2024-12-16
Payer: MEDICARE

## 2024-12-16 ENCOUNTER — LABORATORY RESULT (OUTPATIENT)
Age: 74
End: 2024-12-16

## 2024-12-16 ENCOUNTER — APPOINTMENT (OUTPATIENT)
Age: 74
End: 2024-12-16
Payer: MEDICARE

## 2024-12-16 VITALS
HEIGHT: 69 IN | WEIGHT: 190 LBS | BODY MASS INDEX: 28.14 KG/M2 | SYSTOLIC BLOOD PRESSURE: 179 MMHG | OXYGEN SATURATION: 95 % | RESPIRATION RATE: 16 BRPM | HEART RATE: 84 BPM | TEMPERATURE: 97.5 F | DIASTOLIC BLOOD PRESSURE: 84 MMHG

## 2024-12-16 DIAGNOSIS — D72.829 ELEVATED WHITE BLOOD CELL COUNT, UNSPECIFIED: ICD-10-CM

## 2024-12-16 DIAGNOSIS — D75.839 THROMBOCYTOSIS, UNSPECIFIED: ICD-10-CM

## 2024-12-16 LAB
HCT VFR BLD CALC: 45.1 %
HGB BLD-MCNC: 15.8 G/DL
MCHC RBC-ENTMCNC: 31.1 PG
MCHC RBC-ENTMCNC: 35 G/DL
MCV RBC AUTO: 88.8 FL
PLATELET # BLD AUTO: 237 K/UL
PMV BLD: 10.8 FL
RBC # BLD: 5.08 M/UL
RBC # FLD: 13.6 %
WBC # FLD AUTO: 13.17 K/UL

## 2024-12-16 PROCEDURE — G2211 COMPLEX E/M VISIT ADD ON: CPT

## 2024-12-16 PROCEDURE — 99214 OFFICE O/P EST MOD 30 MIN: CPT

## 2024-12-16 PROCEDURE — 85027 COMPLETE CBC AUTOMATED: CPT

## 2024-12-17 ENCOUNTER — APPOINTMENT (OUTPATIENT)
Dept: ORTHOPEDIC SURGERY | Facility: CLINIC | Age: 74
End: 2024-12-17
Payer: MEDICARE

## 2024-12-17 ENCOUNTER — NON-APPOINTMENT (OUTPATIENT)
Age: 74
End: 2024-12-17

## 2024-12-17 DIAGNOSIS — S52.124A NONDISPLACED FRACTURE OF HEAD OF RIGHT RADIUS, INITIAL ENCOUNTER FOR CLOSED FRACTURE: ICD-10-CM

## 2024-12-17 DIAGNOSIS — D75.839 THROMBOCYTOSIS, UNSPECIFIED: ICD-10-CM

## 2024-12-17 DIAGNOSIS — M77.8 OTHER ENTHESOPATHIES, NOT ELSEWHERE CLASSIFIED: ICD-10-CM

## 2024-12-17 PROCEDURE — 73110 X-RAY EXAM OF WRIST: CPT | Mod: RT

## 2024-12-17 PROCEDURE — 73080 X-RAY EXAM OF ELBOW: CPT | Mod: RT

## 2025-03-18 ENCOUNTER — OUTPATIENT (OUTPATIENT)
Dept: OUTPATIENT SERVICES | Facility: HOSPITAL | Age: 75
LOS: 1 days | End: 2025-03-18
Payer: MEDICARE

## 2025-03-18 ENCOUNTER — APPOINTMENT (OUTPATIENT)
Age: 75
End: 2025-03-18
Payer: MEDICARE

## 2025-03-18 VITALS
WEIGHT: 191 LBS | OXYGEN SATURATION: 98 % | HEART RATE: 84 BPM | RESPIRATION RATE: 16 BRPM | TEMPERATURE: 97.4 F | DIASTOLIC BLOOD PRESSURE: 82 MMHG | SYSTOLIC BLOOD PRESSURE: 179 MMHG | HEIGHT: 69 IN | BODY MASS INDEX: 28.29 KG/M2

## 2025-03-18 DIAGNOSIS — D75.839 THROMBOCYTOSIS, UNSPECIFIED: ICD-10-CM

## 2025-03-18 DIAGNOSIS — D72.829 ELEVATED WHITE BLOOD CELL COUNT, UNSPECIFIED: ICD-10-CM

## 2025-03-18 LAB
AUTO BASOPHILS #: 0.11 K/UL
AUTO BASOPHILS %: 1 %
AUTO EOSINOPHILS #: 0.22 K/UL
AUTO EOSINOPHILS %: 2 %
AUTO IMMATURE GRANULOCYTES #: 0.03 K/UL
AUTO LYMPHOCYTES #: 2.37 K/UL
AUTO LYMPHOCYTES %: 21.8 %
AUTO MONOCYTES #: 0.95 K/UL
AUTO MONOCYTES %: 8.7 %
AUTO NEUTROPHILS #: 7.21 K/UL
AUTO NEUTROPHILS %: 66.2 %
AUTO NRBC #: 0 K/UL
HCT VFR BLD CALC: 46.4 %
HGB BLD-MCNC: 15.9 G/DL
IMM GRANULOCYTES NFR BLD AUTO: 0.3 %
MAN DIFF?: NORMAL
MCHC RBC-ENTMCNC: 31.2 PG
MCHC RBC-ENTMCNC: 34.3 G/DL
MCV RBC AUTO: 91.2 FL
PLATELET # BLD AUTO: 270 K/UL
PMV BLD AUTO: 0 /100 WBCS
PMV BLD: 10.1 FL
RBC # BLD: 5.09 M/UL
RBC # FLD: 13.5 %
WBC # FLD AUTO: 10.89 K/UL

## 2025-03-18 PROCEDURE — 99214 OFFICE O/P EST MOD 30 MIN: CPT

## 2025-03-18 PROCEDURE — 85025 COMPLETE CBC W/AUTO DIFF WBC: CPT

## 2025-03-18 PROCEDURE — G2211 COMPLEX E/M VISIT ADD ON: CPT

## 2025-03-19 DIAGNOSIS — D75.839 THROMBOCYTOSIS, UNSPECIFIED: ICD-10-CM

## 2025-03-31 ENCOUNTER — APPOINTMENT (OUTPATIENT)
Dept: PULMONOLOGY | Facility: CLINIC | Age: 75
End: 2025-03-31
Payer: MEDICARE

## 2025-03-31 VITALS
DIASTOLIC BLOOD PRESSURE: 80 MMHG | OXYGEN SATURATION: 93 % | HEART RATE: 95 BPM | HEIGHT: 69 IN | RESPIRATION RATE: 12 BRPM | WEIGHT: 190 LBS | SYSTOLIC BLOOD PRESSURE: 180 MMHG | BODY MASS INDEX: 28.14 KG/M2

## 2025-03-31 DIAGNOSIS — J84.112 IDIOPATHIC PULMONARY FIBROSIS: ICD-10-CM

## 2025-03-31 DIAGNOSIS — G47.33 OBSTRUCTIVE SLEEP APNEA (ADULT) (PEDIATRIC): ICD-10-CM

## 2025-03-31 DIAGNOSIS — R91.8 OTHER NONSPECIFIC ABNORMAL FINDING OF LUNG FIELD: ICD-10-CM

## 2025-03-31 DIAGNOSIS — J84.9 INTERSTITIAL PULMONARY DISEASE, UNSPECIFIED: ICD-10-CM

## 2025-03-31 PROCEDURE — 99214 OFFICE O/P EST MOD 30 MIN: CPT

## 2025-04-03 ENCOUNTER — OUTPATIENT (OUTPATIENT)
Dept: OUTPATIENT SERVICES | Facility: HOSPITAL | Age: 75
LOS: 1 days | End: 2025-04-03
Payer: MEDICARE

## 2025-04-03 ENCOUNTER — APPOINTMENT (OUTPATIENT)
Dept: PULMONOLOGY | Facility: HOSPITAL | Age: 75
End: 2025-04-03

## 2025-04-03 DIAGNOSIS — R06.02 SHORTNESS OF BREATH: ICD-10-CM

## 2025-04-03 PROCEDURE — 94729 DIFFUSING CAPACITY: CPT | Mod: 26

## 2025-04-03 PROCEDURE — 94729 DIFFUSING CAPACITY: CPT

## 2025-04-03 PROCEDURE — 94727 GAS DIL/WSHOT DETER LNG VOL: CPT | Mod: 26

## 2025-04-03 PROCEDURE — 94727 GAS DIL/WSHOT DETER LNG VOL: CPT

## 2025-04-03 PROCEDURE — 94060 EVALUATION OF WHEEZING: CPT | Mod: 26

## 2025-04-03 PROCEDURE — 94070 EVALUATION OF WHEEZING: CPT

## 2025-04-03 PROCEDURE — 94664 DEMO&/EVAL PT USE INHALER: CPT

## 2025-04-04 DIAGNOSIS — R06.02 SHORTNESS OF BREATH: ICD-10-CM

## 2025-05-01 ENCOUNTER — RESULT REVIEW (OUTPATIENT)
Age: 75
End: 2025-05-01

## 2025-05-01 ENCOUNTER — OUTPATIENT (OUTPATIENT)
Dept: OUTPATIENT SERVICES | Facility: HOSPITAL | Age: 75
LOS: 1 days | End: 2025-05-01
Payer: MEDICARE

## 2025-05-01 DIAGNOSIS — Z00.8 ENCOUNTER FOR OTHER GENERAL EXAMINATION: ICD-10-CM

## 2025-05-01 DIAGNOSIS — J84.9 INTERSTITIAL PULMONARY DISEASE, UNSPECIFIED: ICD-10-CM

## 2025-05-01 PROCEDURE — 71250 CT THORAX DX C-: CPT

## 2025-05-01 PROCEDURE — 71250 CT THORAX DX C-: CPT | Mod: 26

## 2025-05-02 DIAGNOSIS — J84.9 INTERSTITIAL PULMONARY DISEASE, UNSPECIFIED: ICD-10-CM

## 2025-06-25 ENCOUNTER — APPOINTMENT (OUTPATIENT)
Dept: PULMONOLOGY | Facility: CLINIC | Age: 75
End: 2025-06-25
Payer: MEDICARE

## 2025-06-25 VITALS
WEIGHT: 187 LBS | SYSTOLIC BLOOD PRESSURE: 150 MMHG | BODY MASS INDEX: 27.7 KG/M2 | HEART RATE: 109 BPM | RESPIRATION RATE: 12 BRPM | HEIGHT: 69 IN | OXYGEN SATURATION: 96 % | DIASTOLIC BLOOD PRESSURE: 80 MMHG

## 2025-06-25 PROCEDURE — 99214 OFFICE O/P EST MOD 30 MIN: CPT

## 2025-06-25 RX ORDER — NINTEDANIB 100 MG/1
100 CAPSULE ORAL TWICE DAILY
Qty: 180 | Refills: 3 | Status: ACTIVE | COMMUNITY
Start: 2025-06-25 | End: 1900-01-01